# Patient Record
Sex: FEMALE | Race: WHITE | Employment: FULL TIME | URBAN - METROPOLITAN AREA
[De-identification: names, ages, dates, MRNs, and addresses within clinical notes are randomized per-mention and may not be internally consistent; named-entity substitution may affect disease eponyms.]

---

## 2017-12-13 ENCOUNTER — HOSPITAL ENCOUNTER (OUTPATIENT)
Dept: CT IMAGING | Age: 57
Discharge: HOME | End: 2017-12-13
Attending: FAMILY MEDICINE
Payer: COMMERCIAL

## 2017-12-13 DIAGNOSIS — R22.1 LOCALIZED SWELLING, MASS AND LUMP, NECK: ICD-10-CM

## 2017-12-13 PROCEDURE — 70491 CT SOFT TISSUE NECK W/DYE: CPT

## 2017-12-13 PROCEDURE — 74011636320 HC RX REV CODE- 636/320: Performed by: FAMILY MEDICINE

## 2017-12-13 PROCEDURE — 74011000258 HC RX REV CODE- 258: Performed by: FAMILY MEDICINE

## 2017-12-13 RX ORDER — SODIUM CHLORIDE 9 MG/ML
50 INJECTION, SOLUTION INTRAVENOUS
Status: COMPLETED | OUTPATIENT
Start: 2017-12-13 | End: 2017-12-13

## 2017-12-13 RX ADMIN — IOPAMIDOL 100 ML: 612 INJECTION, SOLUTION INTRAVENOUS at 13:35

## 2017-12-13 RX ADMIN — SODIUM CHLORIDE 50 ML/HR: 900 INJECTION, SOLUTION INTRAVENOUS at 13:35

## 2018-05-25 ENCOUNTER — HOSPITAL ENCOUNTER (OUTPATIENT)
Dept: VASCULAR SURGERY | Age: 58
Discharge: HOME | End: 2018-05-25
Attending: FAMILY MEDICINE
Payer: COMMERCIAL

## 2018-05-25 DIAGNOSIS — M79.605 LEFT LEG PAIN: ICD-10-CM

## 2018-05-25 DIAGNOSIS — R79.89 ELEVATED D-DIMER: ICD-10-CM

## 2018-05-25 PROCEDURE — 93971 EXTREMITY STUDY: CPT

## 2019-03-13 ENCOUNTER — HOSPITAL ENCOUNTER (OUTPATIENT)
Dept: CT IMAGING | Age: 59
Discharge: HOME | End: 2019-03-13
Payer: SELF-PAY

## 2019-03-13 DIAGNOSIS — Z29.9 PREVENTIVE MEASURE: ICD-10-CM

## 2019-03-13 PROCEDURE — 75571 CT HRT W/O DYE W/CA TEST: CPT

## 2019-03-14 NOTE — CARDIO/PULMONARY
Reached patient at her given mobile number. She states she has previously received her coronary artery CT score of zero from her PCP and has no further questions at this time.

## 2021-11-11 ENCOUNTER — TRANSCRIBE ORDER (OUTPATIENT)
Dept: SCHEDULING | Age: 61
End: 2021-11-11

## 2021-11-11 DIAGNOSIS — E04.1 NONTOXIC UNINODULAR GOITER: Primary | ICD-10-CM

## 2021-11-17 ENCOUNTER — HOSPITAL ENCOUNTER (OUTPATIENT)
Dept: ULTRASOUND IMAGING | Age: 61
Discharge: HOME | End: 2021-11-17
Attending: FAMILY MEDICINE
Payer: COMMERCIAL

## 2021-11-17 DIAGNOSIS — E04.1 NONTOXIC UNINODULAR GOITER: ICD-10-CM

## 2021-11-17 PROCEDURE — 76536 US EXAM OF HEAD AND NECK: CPT

## 2021-11-21 ENCOUNTER — HOSPITAL ENCOUNTER (EMERGENCY)
Age: 61
Discharge: HOME | End: 2021-11-21
Attending: EMERGENCY MEDICINE
Payer: COMMERCIAL

## 2021-11-21 ENCOUNTER — APPOINTMENT (OUTPATIENT)
Dept: CT IMAGING | Age: 61
End: 2021-11-21
Attending: EMERGENCY MEDICINE
Payer: COMMERCIAL

## 2021-11-21 VITALS
OXYGEN SATURATION: 97 % | HEIGHT: 65 IN | SYSTOLIC BLOOD PRESSURE: 138 MMHG | TEMPERATURE: 98.1 F | WEIGHT: 171.3 LBS | DIASTOLIC BLOOD PRESSURE: 87 MMHG | BODY MASS INDEX: 28.54 KG/M2 | RESPIRATION RATE: 16 BRPM | HEART RATE: 89 BPM

## 2021-11-21 DIAGNOSIS — M54.6 LEFT-SIDED THORACIC BACK PAIN, UNSPECIFIED CHRONICITY: Primary | ICD-10-CM

## 2021-11-21 PROCEDURE — 96372 THER/PROPH/DIAG INJ SC/IM: CPT

## 2021-11-21 PROCEDURE — 74011250636 HC RX REV CODE- 250/636: Performed by: EMERGENCY MEDICINE

## 2021-11-21 PROCEDURE — 99282 EMERGENCY DEPT VISIT SF MDM: CPT

## 2021-11-21 PROCEDURE — 71250 CT THORAX DX C-: CPT

## 2021-11-21 RX ORDER — AMLODIPINE BESYLATE 5 MG/1
5 TABLET ORAL DAILY
COMMUNITY

## 2021-11-21 RX ORDER — KETOROLAC TROMETHAMINE 30 MG/ML
30 INJECTION, SOLUTION INTRAMUSCULAR; INTRAVENOUS ONCE
Status: COMPLETED | OUTPATIENT
Start: 2021-11-21 | End: 2021-11-21

## 2021-11-21 RX ORDER — CYCLOBENZAPRINE HCL 5 MG
5 TABLET ORAL
Qty: 30 TABLET | Refills: 0 | Status: SHIPPED | OUTPATIENT
Start: 2021-11-21

## 2021-11-21 RX ORDER — LOSARTAN POTASSIUM AND HYDROCHLOROTHIAZIDE 25; 100 MG/1; MG/1
1 TABLET ORAL DAILY
COMMUNITY

## 2021-11-21 RX ADMIN — KETOROLAC TROMETHAMINE 30 MG: 30 INJECTION, SOLUTION INTRAMUSCULAR at 13:22

## 2021-11-21 NOTE — ED TRIAGE NOTES
Pt ambulates to treatment area she states that she has had t spine pain for a long time and is to have an MRI this coming Wednesday. This morning she got up with very sharp pain to the left lower back side of her rib cage and states the pain is worse with movement or deep breath.

## 2021-11-21 NOTE — ED PROVIDER NOTES
Date of Service:  11/21/2021    Patient:  Paula Barrios    Chief Complaint:  Back Pain       HPI:  Paula Barrios is a 63 y.o.  female who presents for evaluation of back pain. Patient has a history of chronic back pain. Morning she woke up with some mid and lower thoracic discomfort with radiation around to her left side. No chest pain or shortness of breath. No abdominal pain. No fevers chills. No change in bowel or bladder habits. Pain worse with deep breath and specific movements. Hurts when she lays down. No recent trauma. She is not had this type of pain before but she has had back pain similar to it before she is never in this radiation. No history of blood clots. No shortness of breath. No other acute complaints. 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         Family History:   Problem Relation Age of Onset    Heart Attack Father         age 64 fatal   Lilyan Montane Hypertension Father     Hypertension Mother     Diabetes Paternal Grandfather     Heart Disease Paternal Grandfather     Hypertension Paternal Grandfather     Heart Disease Paternal Grandmother     Hypertension Paternal Grandmother        Social History     Socioeconomic History    Marital status:      Spouse name: Markos Abreu Number of children: 2    Years of education: MA    Highest education level: Not on file   Occupational History    Occupation: Medical Assistant   Tobacco Use    Smoking status: Never Smoker    Smokeless tobacco: Never Used   Substance and Sexual Activity    Alcohol use:  Yes     Alcohol/week: 1.7 standard drinks     Types: 2 Standard drinks or equivalent per week    Drug use: No    Sexual activity: Yes     Partners: Male   Other Topics Concern    Not on file   Social History Narrative    Not on file     Social Determinants of Health     Financial Resource Strain:     Difficulty of Paying Living Expenses: Not on file   Food Insecurity:     Worried About Running Out of Food in the Last Year: Not on file    Teddy of Food in the Last Year: Not on file   Transportation Needs:     Lack of Transportation (Medical): Not on file    Lack of Transportation (Non-Medical): Not on file   Physical Activity:     Days of Exercise per Week: Not on file    Minutes of Exercise per Session: Not on file   Stress:     Feeling of Stress : Not on file   Social Connections:     Frequency of Communication with Friends and Family: Not on file    Frequency of Social Gatherings with Friends and Family: Not on file    Attends Congregation Services: Not on file    Active Member of [address] [place] [address] or Organizations: Not on file    Attends Club or Organization Meetings: Not on file    Marital Status: Not on file   Intimate Partner Violence:     Fear of Current or Ex-Partner: Not on file    Emotionally Abused: Not on file    Physically Abused: Not on file    Sexually Abused: Not on file   Housing Stability:     Unable to Pay for Housing in the Last Year: Not on file    Number of [place] in the Last Year: Not on file    Unstable Housing in the Last Year: Not on file         ALLERGIES: Amoxicillin    Review of Systems   Constitutional: Negative for fever. HENT: Negative for hearing loss. Eyes: Negative for visual disturbance. Respiratory: Negative for shortness of breath. Cardiovascular: Negative for chest pain. Gastrointestinal: Negative for abdominal pain. Genitourinary: Negative for difficulty urinating and flank pain. Musculoskeletal: Positive for back pain. Negative for gait problem, neck pain and neck stiffness. Skin: Negative for rash. Neurological: Negative for dizziness, weakness, light-headedness and numbness. Psychiatric/Behavioral: Negative for confusion. Vitals:    11/21/21 1202   BP: 138/87   Pulse: 89   Resp: 16   Temp: 98.1 °F (36.7 °C)   SpO2: 99%   Weight: 77.7 kg (171 lb 4.8 oz)   Height: 5' 5\" (1.651 m)            Physical Exam  Vitals and nursing note reviewed. Constitutional:       Appearance: Normal appearance. HENT:      Head: Normocephalic and atraumatic. Nose: Nose normal.      Mouth/Throat:      Mouth: Mucous membranes are moist.   Eyes:      General: No scleral icterus. Cardiovascular:      Rate and Rhythm: Normal rate. Pulmonary:      Effort: Pulmonary effort is normal.   Abdominal:      General: Abdomen is flat. Musculoskeletal:         General: Tenderness (left paraspinal region into ribs. no rash., no midline tenderness) present. Normal range of motion. Skin:     General: Skin is warm. Capillary Refill: Capillary refill takes less than 2 seconds. Neurological:      Mental Status: She is alert and oriented to person, place, and time. Psychiatric:         Mood and Affect: Mood normal.          MDM     VITAL SIGNS:  Patient Vitals for the past 4 hrs:   Temp Pulse Resp BP SpO2   11/21/21 1202 98.1 °F (36.7 °C) 89 16 138/87 99 %         LABS:  No results found for this or any previous visit (from the past 6 hour(s)). IMAGING:  CT CHEST WO CONT   Final Result   1. No acute findings in the thoracic spine. 2.  Indeterminate left renal lesions. Further evaluation with CT renal protocol   recommended. 3.  Indeterminate splenic lesion, probably not clinically significant. Medications During Visit:  Medications   ketorolac tromethamine (TORADOL) 60 mg/2 mL injection 30 mg (30 mg IntraMUSCular Given 11/21/21 1322)         DECISION MAKING:  Renetta Sawyer is a 63 y.o. female who comes in as above. Here, patient appears well.   Imaging does not reveal any obvious acute causes of her discomfort. Based on her description/most likely musculoskeletal.  Will provide medicines as below and have patient follow-up with PCP      IMPRESSION:  1. Left-sided thoracic back pain, unspecified chronicity        DISPOSITION:  Discharged      Current Discharge Medication List      START taking these medications    Details   cyclobenzaprine (FLEXERIL) 5 mg tablet Take 1 Tablet by mouth three (3) times daily as needed for Muscle Spasm(s). Qty: 30 Tablet, Refills: 0  Start date: 11/21/2021              Follow-up Information     Follow up With Specialties Details Why Contact Info    Priti Giraldo MD Family Medicine Schedule an appointment as soon as possible for a visit   [address] [phone]              The patient is asked to follow-up with their primary care provider in the next several days. They are to call tomorrow for an appointment. The patient is asked to return promptly for any increased concerns or worsening of symptoms. They can return to this emergency department or any other emergency department.     Procedures

## 2021-11-21 NOTE — ED NOTES
Pt was discharged and given instructions by Dr Ayala Borden. Pt verbalized good understanding of all discharge instructions,prescriptions and F/U care. All questions answered. Pt in stable condition on discharge.

## 2021-11-22 ENCOUNTER — TRANSCRIBE ORDER (OUTPATIENT)
Dept: SCHEDULING | Age: 61
End: 2021-11-22

## 2021-11-22 DIAGNOSIS — R16.1 SPLENOMEGALY, NOT ELSEWHERE CLASSIFIED: Primary | ICD-10-CM

## 2021-12-03 ENCOUNTER — TRANSCRIBE ORDER (OUTPATIENT)
Dept: SCHEDULING | Age: 61
End: 2021-12-03

## 2021-12-03 DIAGNOSIS — R16.1 SPLENOMEGALY, NOT ELSEWHERE CLASSIFIED: Primary | ICD-10-CM

## 2023-05-16 RX ORDER — AMLODIPINE BESYLATE 5 MG/1
5 TABLET ORAL DAILY
COMMUNITY

## 2023-05-16 RX ORDER — ONDANSETRON 8 MG/1
TABLET, ORALLY DISINTEGRATING ORAL EVERY 8 HOURS PRN
COMMUNITY
Start: 2015-03-13

## 2023-05-16 RX ORDER — CYCLOBENZAPRINE HCL 5 MG
TABLET ORAL 3 TIMES DAILY PRN
COMMUNITY
Start: 2021-11-21

## 2023-05-16 RX ORDER — LOSARTAN POTASSIUM AND HYDROCHLOROTHIAZIDE 25; 100 MG/1; MG/1
1 TABLET ORAL DAILY
COMMUNITY

## 2023-12-28 ENCOUNTER — HOSPITAL ENCOUNTER (OUTPATIENT)
Facility: HOSPITAL | Age: 63
Discharge: HOME | End: 2023-12-28
Attending: OTOLARYNGOLOGY
Payer: COMMERCIAL

## 2023-12-28 DIAGNOSIS — E04.1 NONTOXIC UNINODULAR GOITER: ICD-10-CM

## 2023-12-28 PROCEDURE — 76536 US EXAM OF HEAD AND NECK: CPT
